# Patient Record
Sex: FEMALE | Race: WHITE | NOT HISPANIC OR LATINO | Employment: FULL TIME | URBAN - METROPOLITAN AREA
[De-identification: names, ages, dates, MRNs, and addresses within clinical notes are randomized per-mention and may not be internally consistent; named-entity substitution may affect disease eponyms.]

---

## 2017-01-06 ENCOUNTER — OFFICE VISIT (OUTPATIENT)
Dept: OBSTETRICS AND GYNECOLOGY | Facility: CLINIC | Age: 27
End: 2017-01-06

## 2017-01-06 VITALS
SYSTOLIC BLOOD PRESSURE: 120 MMHG | DIASTOLIC BLOOD PRESSURE: 70 MMHG | HEIGHT: 62 IN | WEIGHT: 185 LBS | BODY MASS INDEX: 34.04 KG/M2

## 2017-01-06 DIAGNOSIS — Z13.9 SCREENING: ICD-10-CM

## 2017-01-06 DIAGNOSIS — Z12.4 SCREENING FOR MALIGNANT NEOPLASM OF CERVIX: ICD-10-CM

## 2017-01-06 DIAGNOSIS — Z01.411 ENCOUNTER FOR GYNECOLOGICAL EXAMINATION WITH ABNORMAL FINDING: Primary | ICD-10-CM

## 2017-01-06 DIAGNOSIS — N39.3 SUI (STRESS URINARY INCONTINENCE, FEMALE): ICD-10-CM

## 2017-01-06 LAB
BILIRUB BLD-MCNC: NEGATIVE MG/DL
CLARITY, POC: ABNORMAL
COLOR UR: ABNORMAL
GLUCOSE UR STRIP-MCNC: NEGATIVE MG/DL
KETONES UR QL: NEGATIVE
LEUKOCYTE EST, POC: NEGATIVE
NITRITE UR-MCNC: NEGATIVE MG/ML
PH UR: 6 [PH] (ref 5–8)
PROT UR STRIP-MCNC: NEGATIVE MG/DL
RBC # UR STRIP: ABNORMAL /UL
SP GR UR: 1.02 (ref 1–1.03)
UROBILINOGEN UR QL: NORMAL

## 2017-01-06 PROCEDURE — 99395 PREV VISIT EST AGE 18-39: CPT | Performed by: OBSTETRICS & GYNECOLOGY

## 2017-01-06 PROCEDURE — 81002 URINALYSIS NONAUTO W/O SCOPE: CPT | Performed by: OBSTETRICS & GYNECOLOGY

## 2017-01-06 NOTE — MR AVS SNAPSHOT
Shikha Crowell   2017 10:00 AM   Office Visit    Dept Phone:  [phone]   Encounter #:  [id number]    Provider:  Asia Storey MD   Department:  [facility]                Your Full Care Plan              Your Updated Medication List      Notice  As of 2017 10:52 AM    You have not been prescribed any medications.            We Performed the Following     IGP, Rfx Aptima HPV ASCU     POC Urinalysis Dipstick       You Were Diagnosed With        Codes Comments    Encounter for gynecological examination with abnormal finding    -  Primary ICD-10-CM: Z01.411  ICD-9-CM: V72.31     ARMANDO (stress urinary incontinence, female)     ICD-10-CM: N39.3  ICD-9-CM: 625.6     Screening     ICD-10-CM: Z13.9  ICD-9-CM: V82.9     Screening for malignant neoplasm of cervix     ICD-10-CM: Z12.4  ICD-9-CM: V76.2       Instructions     None    Patient Instructions History      Upcoming Appointments     Visit Type Date Time Department    OFFICE VISIT 2017 10:00 AM SHIELA RITCHIE      [employer] Signup     [facility] [employer] allows you to send messages to your doctor, view your test results, renew your prescriptions, schedule appointments, and more. To sign up, go to [email or web address] and click on the Sign Up Now link in the New User? box. Enter your [employer] Activation Code exactly as it appears below along with the last four digits of your Social Security Number and your Date of Birth () to complete the sign-up process. If you do not sign up before the expiration date, you must request a new code.    [employer] Activation Code: [email or web address]  Expires: 2017 10:52 AM    If you have questions, you can email [email or web address] or call [phone] to talk to our [employer] staff. Remember, [employer] is NOT to be used for urgent needs. For medical emergencies, dial 911.               Other Info from Your Visit           Allergies     "Penicillins  Anaphylaxis    Adhesive Tape  Rash      Reason for Visit     Gynecologic Exam last pap 7/14/15 negative      Vital Signs     Blood Pressure Height Weight Last Menstrual Period Breastfeeding? Body Mass Index    120/70 61.5\" (156.2 cm) 185 lb (83.9 kg) 01/31/2016 (Exact Date) No 34.39 kg/m2    Smoking Status                   Former Smoker           Problems and Diagnoses Noted     Encounter for routine gynecological examination    -  Primary    ARMANDO (stress urinary incontinence, female)        Screening        Screening for cervical cancer          Results     POC Urinalysis Dipstick      Component Value Standard Range & Units    Color Dark Yellow Yellow, Straw, Dark Yellow, Jolly    Clarity, UA Slightly Cloudy Clear    Glucose, UA Negative Negative, 1000 mg/dL (3+) mg/dL    Bilirubin Negative Negative    Ketones, UA Negative Negative    Specific Gravity  1.025 1.005 - 1.030    Blood, UA Trace Negative    pH, Urine 6.0 5.0 - 8.0    Protein, POC Negative Negative mg/dL    Urobilinogen, UA Normal Normal    Leukocytes Negative Negative    Nitrite, UA Negative Negative                    "

## 2017-01-06 NOTE — PROGRESS NOTES
"[facility] Obstetrics and Gynecology    GYN ANNUAL EXAM:  Chief Complaint   Patient presents with   • Gynecologic Exam     last pap 7/14/15 negative       Subjective   History of Present Illness    Shikha Crowell is a 26 y.o. female who presents for annual exam.  She is doing well in her children are well.  She's been experiencing leakage of urine when she coughs or sneezes.  She abates this by crossing her legs.  Of also encouraged her to wear panty liners and we discussed the options for treatment including expectant management, wearing panty liners, pelvic floor physical therapy, and surgical options.    Obstetric History:  OB History      Para Term  AB TAB SAB Ectopic Multiple Living    4 2 2  2  2   2         Menstrual History:  Menarche age: 11 years  Patient's last menstrual period was 2016 (exact date).  Period Cycle (Days): 28  Period Duration (Days): 4  Period Pattern: Regular  Menstrual Control: Tampon    Sexual History: active         Family history of breast cancer: no  Family history of ovarian cancer: no  Family history of uterine cancer: yes - mom  Family History of cervical cancer: no  Family History of colon cancer/colon polyps: yes - uncle  Regular self breast exam: yes  Current contraception:TUBAL LIGATION  History of abnormal Pap smear: yes - leep marlo 2  Received Gardasil immunization: Yes  GITA exposure in utero: no  History of abnormal mammogram: no    The following portions of the patient's history were reviewed and updated as appropriate: allergies, current medications, past family history, past medical history, past social history, past surgical history and problem list.    Review of Systems    Pertinent items are noted in HPI.       Objective   Physical Exam    Visit Vitals   • /70   • Ht 61.5\" (156.2 cm)   • Wt 185 lb (83.9 kg)   • LMP 2016 (Exact Date)   • Breastfeeding No   • BMI 34.39 kg/m2       General:   alert, appears stated age and " cooperative   Heart: regular rate and rhythm, S1, S2 normal, no murmur, click, rub or gallop   Lungs: clear to auscultation bilaterally   Abdomen: soft, non-tender, without masses or organomegaly   Breast: inspection negative, no nipple discharge or bleeding, no masses or nodularity palpable   Vulva: normal   Vagina: normal mucosa, normal discharge, cyst uppet left vagina, soft, stable   Cervix: no cervical motion tenderness and no lesions   Uterus: normal size, midline, anteverted, non-tender, mobile   Adnexa: normal adnexa and no mass, fullness, tenderness     Assessment/Plan   Shikha was seen today for gynecologic exam.    Diagnoses and all orders for this visit:    Encounter for gynecological examination with abnormal finding    ARMANDO (stress urinary incontinence, female)    Screening  -     POC Urinalysis Dipstick    Screening for malignant neoplasm of cervix  -     IGP, Rfx Aptima HPV ASCU        Thin prep Pap smear.  Contraception: TUBAL LIGATION.  All questions answered.  Await pap smear results.  Follow up in 1 year.  ARMANDO options such as pelvic floor physical therapy, expectant management and panty liners, and surgical options discussed.  Patient chooses expectant management.               Asia Storey MD,  FACOG

## 2017-01-09 LAB
CONV .: NORMAL
CYTOLOGIST CVX/VAG CYTO: NORMAL
CYTOLOGY CVX/VAG DOC THIN PREP: NORMAL
DX ICD CODE: NORMAL
HIV 1 & 2 AB SER-IMP: NORMAL
OTHER STN SPEC: NORMAL
PATH REPORT.FINAL DX SPEC: NORMAL
STAT OF ADQ CVX/VAG CYTO-IMP: NORMAL

## 2017-01-10 ENCOUNTER — TELEPHONE (OUTPATIENT)
Dept: OBSTETRICS AND GYNECOLOGY | Facility: CLINIC | Age: 27
End: 2017-01-10

## 2017-01-10 NOTE — TELEPHONE ENCOUNTER
----- Message from Asia Storey MD sent at 1/9/2017  6:38 PM EST -----  Congratulations your pap testing is normal      Thank you  Asia Storey MD

## 2020-02-06 ENCOUNTER — OFFICE VISIT (OUTPATIENT)
Dept: OBSTETRICS AND GYNECOLOGY | Age: 30
End: 2020-02-06

## 2020-02-06 VITALS
SYSTOLIC BLOOD PRESSURE: 120 MMHG | HEIGHT: 61 IN | DIASTOLIC BLOOD PRESSURE: 78 MMHG | BODY MASS INDEX: 37.19 KG/M2 | WEIGHT: 197 LBS

## 2020-02-06 DIAGNOSIS — Z01.419 WELL WOMAN EXAM WITH ROUTINE GYNECOLOGICAL EXAM: Primary | ICD-10-CM

## 2020-02-06 PROBLEM — R10.2 PELVIC PAIN: Status: ACTIVE | Noted: 2020-02-06

## 2020-02-06 PROBLEM — N89.8 VAGINAL CYST: Status: ACTIVE | Noted: 2020-02-06

## 2020-02-06 PROBLEM — N94.10 FEMALE DYSPAREUNIA: Status: ACTIVE | Noted: 2020-02-06

## 2020-02-06 PROBLEM — G89.29 CHRONIC PELVIC PAIN IN FEMALE: Status: ACTIVE | Noted: 2020-02-06

## 2020-02-06 PROCEDURE — 99385 PREV VISIT NEW AGE 18-39: CPT | Performed by: NURSE PRACTITIONER

## 2020-02-08 LAB
C TRACH RRNA SPEC QL NAA+PROBE: NEGATIVE
N GONORRHOEA RRNA SPEC QL NAA+PROBE: NEGATIVE
T VAGINALIS DNA SPEC QL NAA+PROBE: NEGATIVE

## 2020-02-10 LAB
CONV .: NORMAL
CYTOLOGIST CVX/VAG CYTO: NORMAL
CYTOLOGY CVX/VAG DOC CYTO: NORMAL
CYTOLOGY CVX/VAG DOC THIN PREP: NORMAL
DX ICD CODE: NORMAL
HIV 1 & 2 AB SER-IMP: NORMAL
OTHER STN SPEC: NORMAL
STAT OF ADQ CVX/VAG CYTO-IMP: NORMAL

## 2020-02-11 ENCOUNTER — TELEPHONE (OUTPATIENT)
Dept: OBSTETRICS AND GYNECOLOGY | Age: 30
End: 2020-02-11

## 2020-02-11 NOTE — TELEPHONE ENCOUNTER
----- Message from RAGHU Ravi sent at 2/10/2020  3:06 PM EST -----  Please inform patient her pap smear returned negative (normal). Thank you.

## 2021-04-16 ENCOUNTER — BULK ORDERING (OUTPATIENT)
Dept: CASE MANAGEMENT | Facility: OTHER | Age: 31
End: 2021-04-16

## 2021-04-16 DIAGNOSIS — Z23 IMMUNIZATION DUE: ICD-10-CM

## 2021-07-12 ENCOUNTER — OFFICE VISIT (OUTPATIENT)
Dept: FAMILY MEDICINE CLINIC | Facility: CLINIC | Age: 31
End: 2021-07-12

## 2021-07-12 VITALS
WEIGHT: 195 LBS | SYSTOLIC BLOOD PRESSURE: 118 MMHG | HEIGHT: 61 IN | HEART RATE: 99 BPM | OXYGEN SATURATION: 98 % | BODY MASS INDEX: 36.82 KG/M2 | TEMPERATURE: 98.9 F | DIASTOLIC BLOOD PRESSURE: 70 MMHG

## 2021-07-12 DIAGNOSIS — M54.42 ACUTE LEFT-SIDED LOW BACK PAIN WITH LEFT-SIDED SCIATICA: Primary | ICD-10-CM

## 2021-07-12 PROCEDURE — 99213 OFFICE O/P EST LOW 20 MIN: CPT | Performed by: NURSE PRACTITIONER

## 2021-07-12 RX ORDER — DICLOFENAC SODIUM 75 MG/1
75 TABLET, DELAYED RELEASE ORAL 2 TIMES DAILY
Qty: 60 TABLET | Refills: 0 | Status: SHIPPED | OUTPATIENT
Start: 2021-07-12 | End: 2021-08-09

## 2021-07-12 RX ORDER — CYCLOBENZAPRINE HCL 10 MG
10 TABLET ORAL 3 TIMES DAILY PRN
Qty: 30 TABLET | Refills: 0 | Status: SHIPPED | OUTPATIENT
Start: 2021-07-12 | End: 2021-08-18

## 2021-07-12 NOTE — PROGRESS NOTES
"Chief Complaint  Establish Care, Cough (pt went to [facility] and dx with bronchitis), and Leg Pain (Right Leg, possible Restless Leg)    Subjective          Shikhajose Crowell presents to [facility]  History of Present Illness     Patient is here today to establish care as a new patient.  She hasn't had a PCP since 2009.       Reports she is here today for follow up on bronchitis.  7/7/2021 treatment    She also wanted to talk about her right leg.  She states it isn't all the time.  She gets a stabbing pain in right hip and right knee and pain radiates completely.    She states she has to move from side to side and move toes around to get it to stop hurting.     Takes aleve. That helps    Isn't happening throughout the day.  Is at the end of the day when she is trying to lay down.      Has been happening for a year a half off and on.  Feels like it gets worse towards the end of the week.     Has numbness and tingling just in toes that comes and goes.     ALLEVIATING factors: not laying on that side, standing.   WORSENING factors: sitting too long    Stands all day.   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    Hasn't ever had any imaging of back.        Objective   Vital Signs:   /70   Pulse 99   Temp 98.9 °F (37.2 °C)   Ht 154.9 cm (61\")   Wt 88.5 kg (195 lb)   SpO2 98%   BMI 36.84 kg/m²     Physical Exam  Constitutional:       Appearance: Normal appearance.   Cardiovascular:      Rate and Rhythm: Normal rate and regular rhythm.      Pulses: Normal pulses.   Pulmonary:      Effort: Pulmonary effort is normal.      Breath sounds: Normal breath sounds.   Musculoskeletal:      Lumbar back: Bony tenderness present. Decreased range of motion. Positive right straight leg raise test and positive left straight leg raise test.      Right hip: Normal.      Left hip: Normal.   Neurological:      Mental Status: She is alert and oriented to person, place, and time. "   Psychiatric:         Mood and Affect: Mood normal.         Behavior: Behavior normal.         Thought Content: Thought content normal.         Judgment: Judgment normal.        Result Review :                 Assessment and Plan    Diagnoses and all orders for this visit:    1. Acute left-sided low back pain with left-sided sciatica (Primary)  -     XR Spine Lumbar 2 or 3 View; Future    Other orders  -     diclofenac (VOLTAREN) 75 MG EC tablet; Take 1 tablet by mouth 2 (Two) Times a Day.  Dispense: 60 tablet; Refill: 0  -     cyclobenzaprine (FLEXERIL) 10 MG tablet; Take 1 tablet by mouth 3 (Three) Times a Day As Needed for Muscle Spasms.  Dispense: 30 tablet; Refill: 0      Discussed the diagnosis of sciatica and that this is most likely related to back pain and not hip pain as she has good range of motion and no pain with hip movements.  Will start NSAIDs and muscle relaxer.  Use of ice recommended 20-minute increments.  We discussed stretches to be done in office and also samples given.  Obtain x-rays and call with results and any changes needed plan of care.  Follow-up if no improvement in condition.  Patient verbalizes understanding.      Follow Up   No follow-ups on file.  Patient was given instructions and counseling regarding her condition or for health maintenance advice. Please see specific information pulled into the AVS if appropriate.

## 2021-08-09 RX ORDER — DICLOFENAC SODIUM 75 MG/1
TABLET, DELAYED RELEASE ORAL
Qty: 60 TABLET | Refills: 0 | Status: SHIPPED | OUTPATIENT
Start: 2021-08-09 | End: 2021-09-08

## 2021-08-18 RX ORDER — CYCLOBENZAPRINE HCL 10 MG
10 TABLET ORAL 3 TIMES DAILY PRN
Qty: 30 TABLET | Refills: 0 | Status: SHIPPED | OUTPATIENT
Start: 2021-08-18 | End: 2021-10-20

## 2021-09-08 RX ORDER — DICLOFENAC SODIUM 75 MG/1
TABLET, DELAYED RELEASE ORAL
Qty: 60 TABLET | Refills: 0 | Status: SHIPPED | OUTPATIENT
Start: 2021-09-08 | End: 2021-10-13

## 2021-09-27 ENCOUNTER — PATIENT MESSAGE (OUTPATIENT)
Dept: FAMILY MEDICINE CLINIC | Facility: CLINIC | Age: 31
End: 2021-09-27

## 2021-09-27 NOTE — TELEPHONE ENCOUNTER
From: Shikha Crowell  To: Francisco Carter, RAGHU  Sent: 9/27/2021 9:59 AM EDT  Subject: Visit Follow-Up Question    Hi went to urgent care this past Monday evening over my left ankle. I had twisted it at work. I am still having problems with it and it is still pretty uncomfortable to put my weight on. They did an xray but I haven't recieved word from the radiologist at all. The initial injury occurred on 9/15/21 it is swell but did not turn colors.

## 2021-10-13 RX ORDER — DICLOFENAC SODIUM 75 MG/1
TABLET, DELAYED RELEASE ORAL
Qty: 60 TABLET | Refills: 0 | Status: SHIPPED | OUTPATIENT
Start: 2021-10-13 | End: 2021-11-12

## 2021-10-20 RX ORDER — CYCLOBENZAPRINE HCL 10 MG
10 TABLET ORAL 3 TIMES DAILY PRN
Qty: 30 TABLET | Refills: 0 | Status: SHIPPED | OUTPATIENT
Start: 2021-10-20 | End: 2022-10-06

## 2021-11-01 ENCOUNTER — TELEPHONE (OUTPATIENT)
Dept: FAMILY MEDICINE CLINIC | Facility: CLINIC | Age: 31
End: 2021-11-01

## 2021-11-01 NOTE — TELEPHONE ENCOUNTER
Spoke to patient and she is stating that she will not be dong these xrays she is going to a Chiro.

## 2021-11-12 RX ORDER — DICLOFENAC SODIUM 75 MG/1
TABLET, DELAYED RELEASE ORAL
Qty: 60 TABLET | Refills: 0 | Status: SHIPPED | OUTPATIENT
Start: 2021-11-12 | End: 2021-12-08

## 2021-12-08 RX ORDER — DICLOFENAC SODIUM 75 MG/1
TABLET, DELAYED RELEASE ORAL
Qty: 60 TABLET | Refills: 0 | Status: SHIPPED | OUTPATIENT
Start: 2021-12-08

## 2022-07-17 ENCOUNTER — TELEMEDICINE (OUTPATIENT)
Dept: FAMILY MEDICINE CLINIC | Facility: TELEHEALTH | Age: 32
End: 2022-07-17

## 2022-07-17 DIAGNOSIS — U07.1 COVID-19: Primary | ICD-10-CM

## 2022-07-17 PROCEDURE — 99213 OFFICE O/P EST LOW 20 MIN: CPT | Performed by: NURSE PRACTITIONER

## 2022-07-17 RX ORDER — DEXTROMETHORPHAN HYDROBROMIDE AND PROMETHAZINE HYDROCHLORIDE 15; 6.25 MG/5ML; MG/5ML
5 SYRUP ORAL 4 TIMES DAILY PRN
Qty: 120 ML | Refills: 0 | Status: SHIPPED | OUTPATIENT
Start: 2022-07-17 | End: 2022-07-27

## 2022-07-17 RX ORDER — IBUPROFEN 800 MG/1
800 TABLET ORAL EVERY 6 HOURS PRN
Qty: 60 TABLET | Refills: 0 | Status: SHIPPED | OUTPATIENT
Start: 2022-07-17 | End: 2022-08-16

## 2022-07-17 RX ORDER — PREDNISONE 20 MG/1
20 TABLET ORAL 2 TIMES DAILY
Qty: 14 TABLET | Refills: 0 | Status: SHIPPED | OUTPATIENT
Start: 2022-07-17 | End: 2022-07-24

## 2022-07-18 NOTE — PATIENT INSTRUCTIONS
"The [place] Centers for Disease Control and Prevention (CDC) has issued recommendations for discontinuing home isolation in the general community. A symptom- or time-based strategy is preferred for most patients.    For symptomatic immunocompetent patients with mild disease who are cared for at home, isolation can usually be discontinued when the following criteria are met:    ?At least five days have passed since symptoms first appeared AND    ?At least one day (24 hours) has passed since resolution of fever without the use of fever-reducing medications AND     ?There is improvement in symptoms (eg, cough, shortness of breath)    After discontinuing home isolation, patients should continue to wear a well-fitting mask around others. The total duration of isolation plus strict masking is 10 days. During this time, patients should avoid people who are immunocompromised or at high risk for severe disease. Additional information on restrictions (eg, travel) after the five-day isolation period can be found on the CDC website.     Upper Respiratory Infections  Management is centered around symptom relief. Symptom relief consists of drinking plenty of NON-Caffeinated fluids to thin secretions, cool mist humidifier to sooth sinus congestion and inflammation, warm compresses to face for sinus pain and pressure relief, Nasal rinses to clear mucous. Over the Counter medications can help with symptoms as follows:    NSAIDs (Non-Steroidal Anti-Inflammatory Drugs) such as Ibuprofen are more beneficial for pain and inflammation. (May not be appropriate if you have High blood Pressure, Ulcers or Hx of GI bleeding).  Mucinex can thin the mucous and secretions to help clear the \"snot\" so that you can breath easier. Blow your nose often, use nasal rinse to clear as needed.  Decongestants- oral such as Sudafed (if no heart disease or Hypertension) or nasal such as Afrin for 2-3 days only to help breath through your nose easier " and relieve nasal congestion.  Antihistamine-use if seasonal allergies are a problem and/or you feel like you have drainage that could be traveling down the back of the throat and causing cough and/or sore throat.  Steroid nasal spray such as Flonase-help to decrease the inflammation in your nasal and sinus passages and decrease the mucous and nasal congestion. 10 Things You Can Do to Manage Your COVID-19 Symptoms at Home  If you have possible or confirmed COVID-19:  Stay home except to get medical care.  Monitor your symptoms carefully. If your symptoms get worse, call your healthcare provider immediately.  Get rest and stay hydrated.  If you have a medical appointment, call the healthcare provider ahead of time and tell them that you have or may have COVID-19.  For medical emergencies, call 911 and notify the dispatch personnel that you have or may have COVID-19.  Cover your cough and sneezes with a tissue or use the inside of your elbow.  Wash your hands often with soap and water for at least 20 seconds or clean your hands with an alcohol-based hand  that contains at least 60% alcohol.  As much as possible, stay in a specific room and away from other people in your home. Also, you should use a separate bathroom, if available. If you need to be around other people in or outside of the home, wear a mask.  Avoid sharing personal items with other people in your household, like dishes, towels, and bedding.  Clean all surfaces that are touched often, like counters, tabletops, and doorknobs. Use household cleaning sprays or wipes according to the label instructions.  cdc.gov/coronavirus  07/16/2021  This information is not intended to replace advice given to you by your health care provider. Make sure you discuss any questions you have with your health care provider.  Document Revised: 11/01/2021 Document Reviewed: 11/01/2021  Elsevier Patient Education © 2021 Elsevier Inc.

## 2022-07-18 NOTE — PROGRESS NOTES
You have chosen to receive care through a telehealth visit.The use of a video visit has been reviewed with the patient and verbal informed consent has been obtained. Video Options: MyChart/Zoom The visit included audio and video interaction. No technical issues occurred during this visit.  Pt Location: Home  Provider location: [place], [place]  Video Visit Reason:   Free Text Description: Positive covid test on 7/15/22 excessive sinus pressure. Migraine. Cough. Body aches    Chief Complaint  Cough and Headache (Sinus pressure and Migraine with positive Covid test on 7/15/2022)    Subjective          Shikha Crowell presents to [facility]  Covid positive on 7/15/2022 after symptoms started that day. She has had headache with fever up to 101, body aches and cough with upper respiratory congestion. She has been taking Delsym and Aleve but it is not helping.     Cough  This is a new problem. The current episode started in the past 7 days. The problem has been rapidly worsening. The problem occurs every few minutes. The cough is productive of sputum. Associated symptoms include chills, a fever, headaches, myalgias and postnasal drip. Pertinent negatives include no shortness of breath or wheezing. She has tried OTC cough suppressant for the symptoms. The treatment provided no relief.   Headache  Headache pattern:  Some headache always there, and the pain level varies    Objective   Vital Signs:   There were no vitals taken for this visit.    Physical Exam   Constitutional: She appears well-nourished. No distress.   Pulmonary/Chest: Effort normal.  No respiratory distress.  Neurological: She is alert.   Psychiatric: She has a normal mood and affect.     Result Review :                 Assessment and Plan    Diagnoses and all orders for this visit:    1. COVID-19 (Primary)  -     ibuprofen (ADVIL,MOTRIN) 800 MG tablet; Take 1 tablet by mouth Every 6 (Six) Hours As Needed for Moderate Pain  for up  "to 30 days.  Dispense: 60 tablet; Refill: 0  -     promethazine-dextromethorphan (PROMETHAZINE-DM) 6.25-15 MG/5ML syrup; Take 5 mL by mouth 4 (Four) Times a Day As Needed for Cough for up to 10 days.  Dispense: 120 mL; Refill: 0  -     predniSONE (DELTASONE) 20 MG tablet; Take 1 tablet by mouth 2 (Two) Times a Day for 7 days.  Dispense: 14 tablet; Refill: 0    Home isolation discussed.     Upper Respiratory Infections  Management is centered around symptom relief. Symptom relief consists of drinking plenty of NON-Caffeinated fluids to thin secretions, cool mist humidifier to sooth sinus congestion and inflammation, warm compresses to face for sinus pain and pressure relief, Nasal rinses to clear mucous. Over the Counter medications can help with symptoms as follows:    NSAIDs (Non-Steroidal Anti-Inflammatory Drugs) such as Ibuprofen are more beneficial for pain and inflammation. (May not be appropriate if you have High blood Pressure, Ulcers or Hx of GI bleeding).  Mucinex can thin the mucous and secretions to help clear the \"snot\" so that you can breath easier. Blow your nose often, use nasal rinse to clear as needed.  Decongestants- oral such as Sudafed (if no heart disease or Hypertension) or nasal such as Afrin for 2-3 days only to help breath through your nose easier and relieve nasal congestion.  Antihistamine-use if seasonal allergies are a problem and/or you feel like you have drainage that could be traveling down the back of the throat and causing cough and/or sore throat.  Steroid nasal spray such as Flonase-help to decrease the inflammation in your nasal and sinus passages and decrease the mucous and nasal congestion.         I spent 20 minutes caring for Shikha on this date of service. This time includes time spent by me in the following activities:preparing for the visit, obtaining and/or reviewing a separately obtained history, performing a medically appropriate examination and/or evaluation , " counseling and educating the patient/family/caregiver, ordering medications, tests, or procedures, and documenting information in the medical record  Follow Up   Return if symptoms worsen or fail to improve.  Patient was given instructions and counseling regarding her condition or for health maintenance advice. Please see specific information pulled into the AVS if appropriate.